# Patient Record
Sex: MALE | Race: WHITE | Employment: FULL TIME | URBAN - METROPOLITAN AREA
[De-identification: names, ages, dates, MRNs, and addresses within clinical notes are randomized per-mention and may not be internally consistent; named-entity substitution may affect disease eponyms.]

---

## 2020-08-04 PROBLEM — E88.81 METABOLIC SYNDROME: Status: ACTIVE | Noted: 2020-08-04

## 2020-08-04 PROBLEM — E78.6 ABNORMALLY LOW HIGH DENSITY LIPOPROTEIN (HDL) CHOLESTEROL WITH HYPERTRIGLYCERIDEMIA: Status: ACTIVE | Noted: 2020-07-22

## 2020-08-04 PROBLEM — E78.1 ABNORMALLY LOW HIGH DENSITY LIPOPROTEIN (HDL) CHOLESTEROL WITH HYPERTRIGLYCERIDEMIA: Status: ACTIVE | Noted: 2020-07-22

## 2020-10-05 PROCEDURE — 88305 TISSUE EXAM BY PATHOLOGIST: CPT | Performed by: INTERNAL MEDICINE

## 2021-02-15 PROBLEM — M54.50 CHRONIC LEFT-SIDED LOW BACK PAIN: Status: ACTIVE | Noted: 2021-02-15

## 2021-02-15 PROBLEM — G89.29 CHRONIC LEFT-SIDED LOW BACK PAIN: Status: ACTIVE | Noted: 2021-02-15

## 2021-02-17 PROBLEM — M79.644 PAIN OF RIGHT THUMB: Status: ACTIVE | Noted: 2021-02-17

## 2021-02-22 PROBLEM — I71.2 ASCENDING AORTIC ANEURYSM: Status: ACTIVE | Noted: 2021-02-22

## 2021-02-22 PROBLEM — I25.10 CAD IN NATIVE ARTERY: Status: ACTIVE | Noted: 2021-02-22

## 2021-02-22 PROBLEM — I35.0 AORTIC VALVE STENOSIS, NONRHEUMATIC: Status: ACTIVE | Noted: 2021-02-22

## 2021-02-22 PROBLEM — I71.21 ASCENDING AORTIC ANEURYSM: Status: ACTIVE | Noted: 2021-02-22

## 2021-02-22 PROBLEM — I71.2 ASCENDING AORTIC ANEURYSM (HCC): Status: ACTIVE | Noted: 2021-02-22

## 2021-04-05 PROBLEM — Z51.81 MEDICATION MONITORING ENCOUNTER: Status: ACTIVE | Noted: 2021-04-05

## 2021-04-05 PROBLEM — E66.09 CLASS 1 OBESITY DUE TO EXCESS CALORIES WITH SERIOUS COMORBIDITY AND BODY MASS INDEX (BMI) OF 34.0 TO 34.9 IN ADULT: Status: ACTIVE | Noted: 2021-04-05

## 2021-09-30 PROBLEM — R73.03 PREDIABETES: Status: ACTIVE | Noted: 2021-09-30

## 2022-03-16 PROBLEM — F51.04 PSYCHOPHYSIOLOGICAL INSOMNIA: Status: ACTIVE | Noted: 2022-03-16

## 2022-03-16 PROBLEM — F32.1 MDD (MAJOR DEPRESSIVE DISORDER), SINGLE EPISODE, MODERATE (HCC): Status: ACTIVE | Noted: 2022-03-16

## 2022-03-16 PROBLEM — F41.1 GAD (GENERALIZED ANXIETY DISORDER): Status: ACTIVE | Noted: 2022-03-16

## 2022-03-16 PROBLEM — F33.0 MDD (MAJOR DEPRESSIVE DISORDER), RECURRENT EPISODE, MILD (HCC): Status: ACTIVE | Noted: 2022-03-16

## 2022-03-17 PROBLEM — I77.810 AORTIC ROOT DILATATION (HCC): Status: ACTIVE | Noted: 2022-03-10

## 2022-03-17 PROBLEM — I71.9 AORTIC ANEURYSM (HCC): Status: ACTIVE | Noted: 2020-09-08

## 2022-03-17 PROBLEM — I35.0 AORTIC VALVE STENOSIS: Status: ACTIVE | Noted: 2017-01-01

## 2022-03-17 PROBLEM — I77.810 ASCENDING AORTA DILATATION (HCC): Status: ACTIVE | Noted: 2022-03-10

## 2022-03-29 ENCOUNTER — APPOINTMENT (OUTPATIENT)
Dept: GENERAL RADIOLOGY | Facility: HOSPITAL | Age: 62
End: 2022-03-29
Attending: EMERGENCY MEDICINE
Payer: COMMERCIAL

## 2022-03-29 ENCOUNTER — APPOINTMENT (OUTPATIENT)
Dept: CT IMAGING | Facility: HOSPITAL | Age: 62
End: 2022-03-29
Attending: EMERGENCY MEDICINE
Payer: COMMERCIAL

## 2022-03-29 ENCOUNTER — HOSPITAL ENCOUNTER (EMERGENCY)
Facility: HOSPITAL | Age: 62
Discharge: HOME | End: 2022-03-29
Attending: EMERGENCY MEDICINE
Payer: COMMERCIAL

## 2022-03-29 VITALS
BODY MASS INDEX: 31.7 KG/M2 | WEIGHT: 214 LBS | SYSTOLIC BLOOD PRESSURE: 130 MMHG | RESPIRATION RATE: 16 BRPM | OXYGEN SATURATION: 97 % | TEMPERATURE: 98 F | DIASTOLIC BLOOD PRESSURE: 72 MMHG | HEIGHT: 69 IN | HEART RATE: 82 BPM

## 2022-03-29 DIAGNOSIS — S80.12XA CONTUSION OF LEFT LEG, INITIAL ENCOUNTER: ICD-10-CM

## 2022-03-29 DIAGNOSIS — S60.418A ABRASION OF INDEX FINGER, INITIAL ENCOUNTER: ICD-10-CM

## 2022-03-29 DIAGNOSIS — S80.11XA CONTUSION OF RIGHT LEG, INITIAL ENCOUNTER: ICD-10-CM
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
## 2022-03-29 DIAGNOSIS — S32.009A CLOSED FRACTURE OF TRANSVERSE PROCESS OF LUMBAR VERTEBRA, INITIAL ENCOUNTER (HCC): Primary | ICD-10-CM

## 2022-03-29 DIAGNOSIS — S09.90XA CLOSED HEAD INJURY, INITIAL ENCOUNTER: ICD-10-CM

## 2022-03-29 DIAGNOSIS — M54.50 LOW BACK PAIN AT MULTIPLE SITES: ICD-10-CM

## 2022-03-29 LAB
ALBUMIN SERPL-MCNC: 4.1 G/DL (ref 3.4–5)
ALBUMIN/GLOB SERPL: 1 {RATIO} (ref 1–2)
ALP LIVER SERPL-CCNC: 69 U/L
ALT SERPL-CCNC: 25 U/L
ANION GAP SERPL CALC-SCNC: 5 MMOL/L (ref 0–18)
AST SERPL-CCNC: 26 U/L (ref 15–37)
BASOPHILS # BLD AUTO: 0.03 X10(3) UL (ref 0–0.2)
BASOPHILS NFR BLD AUTO: 0.3 %
BILIRUB SERPL-MCNC: 0.8 MG/DL (ref 0.1–2)
BUN BLD-MCNC: 14 MG/DL (ref 7–18)
CALCIUM BLD-MCNC: 9.3 MG/DL (ref 8.5–10.1)
CO2 SERPL-SCNC: 27 MMOL/L (ref 21–32)
CREAT BLD-MCNC: 1.08 MG/DL
EOSINOPHIL # BLD AUTO: 0.07 X10(3) UL (ref 0–0.7)
EOSINOPHIL NFR BLD AUTO: 0.6 %
ERYTHROCYTE [DISTWIDTH] IN BLOOD BY AUTOMATED COUNT: 14.3 %
GLOBULIN PLAS-MCNC: 4.1 G/DL (ref 2.8–4.4)
GLUCOSE BLD-MCNC: 82 MG/DL (ref 70–99)
HCT VFR BLD AUTO: 45.2 %
HGB BLD-MCNC: 14.8 G/DL
IMM GRANULOCYTES # BLD AUTO: 0.04 X10(3) UL (ref 0–1)
IMM GRANULOCYTES NFR BLD: 0.4 %
LYMPHOCYTES # BLD AUTO: 2.86 X10(3) UL (ref 1–4)
LYMPHOCYTES NFR BLD AUTO: 25.5 %
MCH RBC QN AUTO: 27.6 PG (ref 26–34)
MCHC RBC AUTO-ENTMCNC: 32.7 G/DL (ref 31–37)
MCV RBC AUTO: 84.2 FL
MONOCYTES NFR BLD AUTO: 6 %
NEUTROPHILS # BLD AUTO: 7.56 X10 (3) UL (ref 1.5–7.7)
NEUTROPHILS # BLD AUTO: 7.56 X10(3) UL (ref 1.5–7.7)
NEUTROPHILS NFR BLD AUTO: 67.2 %
OSMOLALITY SERPL CALC.SUM OF ELEC: 276 MOSM/KG (ref 275–295)
PLATELET # BLD AUTO: 234 10(3)UL (ref 150–450)
POTASSIUM SERPL-SCNC: 3.9 MMOL/L (ref 3.5–5.1)
PROT SERPL-MCNC: 8.2 G/DL (ref 6.4–8.2)
RBC # BLD AUTO: 5.37 X10(6)UL
SODIUM SERPL-SCNC: 133 MMOL/L (ref 136–145)
WBC # BLD AUTO: 11.2 X10(3) UL (ref 4–11)

## 2022-03-29 PROCEDURE — 73130 X-RAY EXAM OF HAND: CPT | Performed by: EMERGENCY MEDICINE

## 2022-03-29 PROCEDURE — 36415 COLL VENOUS BLD VENIPUNCTURE: CPT

## 2022-03-29 PROCEDURE — 99285 EMERGENCY DEPT VISIT HI MDM: CPT

## 2022-03-29 PROCEDURE — 85025 COMPLETE CBC W/AUTO DIFF WBC: CPT | Performed by: EMERGENCY MEDICINE

## 2022-03-29 PROCEDURE — 74177 CT ABD & PELVIS W/CONTRAST: CPT | Performed by: EMERGENCY MEDICINE

## 2022-03-29 PROCEDURE — 70450 CT HEAD/BRAIN W/O DYE: CPT | Performed by: EMERGENCY MEDICINE

## 2022-03-29 PROCEDURE — 80053 COMPREHEN METABOLIC PANEL: CPT | Performed by: EMERGENCY MEDICINE

## 2022-03-29 PROCEDURE — 73590 X-RAY EXAM OF LOWER LEG: CPT | Performed by: EMERGENCY MEDICINE

## 2022-03-29 PROCEDURE — 72125 CT NECK SPINE W/O DYE: CPT | Performed by: EMERGENCY MEDICINE

## 2022-03-29 PROCEDURE — 72110 X-RAY EXAM L-2 SPINE 4/>VWS: CPT | Performed by: EMERGENCY MEDICINE

## 2022-03-29 RX ORDER — HYDROCODONE BITARTRATE AND ACETAMINOPHEN 5; 325 MG/1; MG/1
1 TABLET ORAL ONCE
Status: COMPLETED | OUTPATIENT
Start: 2022-03-29 | End: 2022-03-29

## 2022-03-29 RX ORDER — HYDROCODONE BITARTRATE AND ACETAMINOPHEN 5; 325 MG/1; MG/1
1 TABLET ORAL EVERY 6 HOURS PRN
Qty: 10 TABLET | Refills: 0 | Status: SHIPPED | OUTPATIENT
Start: 2022-03-29 | End: 2022-04-03

## 2022-03-29 RX ORDER — IOHEXOL 350 MG/ML
100 INJECTION, SOLUTION INTRAVENOUS
Status: COMPLETED | OUTPATIENT
Start: 2022-03-29 | End: 2022-03-29
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
## 2022-04-02 PROBLEM — M85.852 OSTEOPENIA OF NECK OF LEFT FEMUR: Status: ACTIVE | Noted: 2022-03-28

## 2022-05-02 ENCOUNTER — OFFICE VISIT (OUTPATIENT)
Dept: SURGERY | Facility: CLINIC | Age: 62
End: 2022-05-02
Payer: COMMERCIAL

## 2022-05-02 VITALS
DIASTOLIC BLOOD PRESSURE: 90 MMHG | HEART RATE: 70 BPM | WEIGHT: 214 LBS | SYSTOLIC BLOOD PRESSURE: 120 MMHG | HEIGHT: 69 IN | BODY MASS INDEX: 31.7 KG/M2

## 2022-05-02 DIAGNOSIS — S32.009A CLOSED FRACTURE OF TRANSVERSE PROCESS OF LUMBAR VERTEBRA, INITIAL ENCOUNTER (HCC): Primary | ICD-10-CM

## 2022-05-02 DIAGNOSIS — M54.59 MECHANICAL LOW BACK PAIN: ICD-10-CM

## 2022-05-02 RX ORDER — BUPROPION HYDROCHLORIDE 200 MG/1
200 TABLET, EXTENDED RELEASE ORAL EVERY MORNING
COMMUNITY
Start: 2022-04-29

## 2022-05-02 RX ORDER — TRAMADOL HYDROCHLORIDE 50 MG/1
50 TABLET ORAL
Qty: 60 TABLET | Refills: 0 | Status: SHIPPED | OUTPATIENT
Start: 2022-05-02

## 2022-05-02 RX ORDER — PLECANATIDE 3 MG/1
1 TABLET ORAL DAILY
COMMUNITY
Start: 2022-04-28 | End: 2022-05-28
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
## 2022-05-11 ENCOUNTER — TELEPHONE (OUTPATIENT)
Dept: PHYSICAL THERAPY | Facility: HOSPITAL | Age: 62
End: 2022-05-11

## 2022-05-16 ENCOUNTER — OFFICE VISIT (OUTPATIENT)
Dept: PHYSICAL THERAPY | Age: 62
End: 2022-05-16
Attending: NEUROLOGICAL SURGERY
Payer: COMMERCIAL

## 2022-05-16 DIAGNOSIS — M54.59 MECHANICAL LOW BACK PAIN: ICD-10-CM

## 2022-05-16 DIAGNOSIS — S32.009A CLOSED FRACTURE OF TRANSVERSE PROCESS OF LUMBAR VERTEBRA, INITIAL ENCOUNTER (HCC): ICD-10-CM

## 2022-05-16 PROCEDURE — 97110 THERAPEUTIC EXERCISES: CPT

## 2022-05-16 PROCEDURE — 97161 PT EVAL LOW COMPLEX 20 MIN: CPT

## 2022-05-17 ENCOUNTER — TELEPHONE (OUTPATIENT)
Dept: PHYSICAL THERAPY | Facility: HOSPITAL | Age: 62
End: 2022-05-17

## 2022-05-20 ENCOUNTER — OFFICE VISIT (OUTPATIENT)
Dept: PHYSICAL THERAPY | Age: 62
End: 2022-05-20
Attending: INTERNAL MEDICINE
Payer: COMMERCIAL

## 2022-05-20 PROCEDURE — 97110 THERAPEUTIC EXERCISES: CPT

## 2022-05-20 NOTE — PROGRESS NOTES
Insurance (Authorized # of Visits):  TPL/BCBS OOS ($25 co-pay), 16 visits via plan of care. Diagnosis:   Closed fracture of transverse process of lumbar vertebra, initial encounter ([facility]) (S32.009A)  Mechanical low back pain (M54.59)  Activity as tolerated              Referring Provider: Mary Card   Date of Evaluation:    5/16/2022  Precautions:  None  Next MD visit:   none scheduled  Date of Surgery: n      Subjective: Pain at its worst 3/10. 10% better since last visit. Doing about 60% of home program. Still pain with walking and standing in his back. Objective:   Tested 5/20/2022:  Lumbar AROM: (* denotes performed with pain)  Flexion: min*  Extension: mod*  Sidebending: R min*; L min/mod*  L shift    Strength:  Hip abduction: R 5/5; L 3-/5    Intact:  Piriformis: R intact; L intact    Strength: Hip Extension: R 5/5; L 5/5       Tested 5/16/2022: Observation/Posture: sit slumped and with left lateral bend* (painful)       Assessment: Given trial of flexion/rotation knees to the R. A bit better after abdominal strengthening, added to home program      Goals: (to be met in 16 visits)   Pt will improve FOTO score from 42/100 to 62/100 to display improved ability to navigate stairs  Pt will reduce pain at its worst from 3/10 to 0-1/10 to allow for improved ability to get in and out of bed  Pt will lumbar flexion from min loss with pain to min/nil loss without pain allow for improved ability to do housework  Pt will be independent with home program to allow for maintenance of goals achieved in therapy. Plan:Exhaust flexion/rotation stretching. See about hips off center stretching vs. Sagittal plane stretching. Charges: 3 there ex     Total Timed Treatment: 45 min  Total Treatment Time: 45 min  Date: 5/20/2022  Tx#: 2 Date: Tx#: 3 Date: Tx#: 4 Date: Tx#: 5 Date:   Tx#: 6   Ther-Ex  R side glide (L shoulder on wall) x20: a bit better    Shift correction lateral only x20: a bit more better    Flexion/rotation knees to the R 3 minutes: more better (6 sets done 30'-3 minutes)    Flexion lumbar with red bands 30x2: better after    Educated on home program, see below. Verbal, visual and tactile cues for there ex.          Manual         N Re-Ed

## 2022-05-23 ENCOUNTER — OFFICE VISIT (OUTPATIENT)
Dept: PHYSICAL THERAPY | Age: 62
End: 2022-05-23
Attending: NEUROLOGICAL SURGERY
Payer: COMMERCIAL

## 2022-05-23 DIAGNOSIS — M54.59 MECHANICAL LOW BACK PAIN: ICD-10-CM

## 2022-05-23 DIAGNOSIS — S32.009A CLOSED FRACTURE OF TRANSVERSE PROCESS OF LUMBAR VERTEBRA, INITIAL ENCOUNTER (HCC): ICD-10-CM

## 2022-05-23 PROCEDURE — 97110 THERAPEUTIC EXERCISES: CPT

## 2022-05-23 PROCEDURE — 97112 NEUROMUSCULAR REEDUCATION: CPT

## 2022-05-23 PROCEDURE — 97140 MANUAL THERAPY 1/> REGIONS: CPT

## 2022-05-23 NOTE — PROGRESS NOTES
Insurance (Authorized # of Visits):  TPL/BCBS OOS ($25 co-pay), 16 visits via plan of care. Diagnosis:   Closed fracture of transverse process of lumbar vertebra, initial encounter ([facility]) (S32.009A)  Mechanical low back pain (M54.59)  Activity as tolerated              Referring Provider: Jenifer Rolon   Date of Evaluation:    5/16/2022  Precautions:  None  Next MD visit:   none scheduled  Date of Surgery: n      Subjective: Pain at its worst 3/10. 15% better since overall since starting Physical Therapy. Doing about 60% of home program, limited two days ago because felt some weakness post-COVID booster shot. Still pain with walking and standing in his back. Objective:   Tested 5/23/2022:  Lumbar AROM: (* denotes performed with pain)  Flexion: min*  Extension: mod/min*  Sidebending: R min*; L min/mod*    Strength:  Hip abduction: R 4/5* (R hip); L 4-/5* (back)    Blood pressure:  139/102 mmHg (during patient forces flexion/rotation knees to the R)  151/92 mmHg (after about 2 minutes rest after the above exercise, supine)    Observation/Posture: sit slumped       Assessment: Given trial of prone press ups after flexion/rotation knees to the R, added to home program      Goals: (to be met in 16 visits)   Pt will improve FOTO score from 42/100 to 62/100 to display improved ability to navigate stairs  Pt will reduce pain at its worst from 3/10 to 0-1/10 to allow for improved ability to get in and out of bed  Pt will lumbar flexion from min loss with pain to min/nil loss without pain allow for improved ability to do housework  Pt will be independent with home program to allow for maintenance of goals achieved in therapy. Plan:Exhaust flexion/rotation stretching. See about hips off center stretching vs. Sagittal plane stretching. Charges: 2 there ex, 1 manual therapy     Total Timed Treatment: 45 min  Total Treatment Time: 45 min  Date: 5/20/2022  Tx#: 2 Date: 5/23/2022  Tx#: 3 Date: Tx#: 4 Date:   Tx#: 5 Date: Tx#: 6   Ther-Ex  R side glide (L shoulder on wall) x20: a bit better    Shift correction lateral only x20: a bit more better    Flexion/rotation knees to the R 3 minutes: more better (6 sets done 30'-3 minutes)    Flexion lumbar with red bands 30x2: better after    Educated on home program, see below. Verbal, visual and tactile cues for there ex. There ex:  R side glide, correct form 1 minute (2 sets done, 2nd set 2 minutes)    Prone press up, done initially 15x2: produce, better. After better signs but L shift at rest, can self-correct. Chest fly 15x 7lbs        Educated on home program, see below. Verbal, visual and tactile cues for there ex. Manual   Manual  Extension/rotation mobilization on R side of back    Flexion/rotation to the R therapist forces. N Re-Ed   N Re-Ed  Education on ideal sitting posture, practiced in clinic    Education on fitness activities, told to return to them, instructed to buy 7lbs weights for home use. Education on pathology and plan of care.

## 2022-05-25 ENCOUNTER — OFFICE VISIT (OUTPATIENT)
Dept: PHYSICAL THERAPY | Age: 62
End: 2022-05-25
Attending: NEUROLOGICAL SURGERY
Payer: COMMERCIAL

## 2022-05-25 DIAGNOSIS — M54.59 MECHANICAL LOW BACK PAIN: ICD-10-CM

## 2022-05-25 DIAGNOSIS — S32.009A CLOSED FRACTURE OF TRANSVERSE PROCESS OF LUMBAR VERTEBRA, INITIAL ENCOUNTER (HCC): ICD-10-CM

## 2022-05-25 PROCEDURE — 97140 MANUAL THERAPY 1/> REGIONS: CPT

## 2022-05-25 PROCEDURE — 97110 THERAPEUTIC EXERCISES: CPT

## 2022-05-25 NOTE — PROGRESS NOTES
Progress Summary  Pt has attended 4 visits in Physical Therapy. Insurance (Authorized # of Visits):  TPL/BCBS OOS ($25 co-pay), 16 visits via plan of care. Diagnosis:   Closed fracture of transverse process of lumbar vertebra, initial encounter ([facility]) (S32.009A)  Mechanical low back pain (M54.59)  Activity as tolerated              Referring Provider: Deangelo George   Date of Evaluation:    5/16/2022  Precautions:  None  Next MD visit:   none scheduled  Date of Surgery: n      Subjective: Pain at its worst 2-3/10. 20% better since overall since starting Physical Therapy. Doing about 60% of home program. Still pain with heavy housework. Unable to put splash guards on his car due to the position. However, no can lift groceries and perform stair navigation without difficulty. Objective:   Tested 5/25/2022:  Lumbar AROM: (* denotes performed with pain)  Flexion: min/nil  Extension: mod*  Sidebending: R min*; L min/mod*    Strength:  Hip abduction: R 4+/5; L 4+/5    Blood pressure:  163/112 mmHg (during patient forces flexion/rotation knees to the L)  145/95 mmHg (At rest, supine)      Assessment: Patient has met most short term goals and is progressing towards long term goals after four visits of Physical Therapy. Goals: (to be met in 16 visits)   Pt will improve FOTO score from 42/100 to 62/100 to display improved ability to navigate stairs (Goal met 5/25/2022)  Pt will reduce pain at its worst from 3/10 to 0-1/10 to allow for improved ability to get in and out of bed (15% progress on 5/25/2022)  Pt will lumbar flexion from min loss with pain to min/nil loss without pain allow for improved ability to do housework (Goal met 5/25/2022)  Pt will be independent with home program to allow for maintenance of goals achieved in therapy.  (60% progress on 5/25/2022)    New Goals 5/25/2022:  Pt will improve FOTO score from 70/100 to 80/100 to display improved ability to perform heavy housework  Pt will display side glide loss of motion from min/mod loss to min/nil loss to improve ability to perform heavy housework    New Goals 5/25/2022    FOTO: 70/100    Plan: Continue skilled Physical Therapy 2 x/week or a total of 16 visits over a 90 day period. Treatment will include: manual therapy, therapeutic exercise, therapeutic activites, neuromuscular reeducation and home program         Patient/Family/Caregiver was advised of these findings, precautions, and treatment options and has agreed to actively participate in planning and for this course of care. Thank you for your referral. If you have any questions, please contact me at Dept: [phone]. Sincerely,  Electronically signed by therapist: Carol Dickey PT     Physician's certification required:  No  Please co-sign or sign and return this letter via fax as soon as possible to [phone]. I certify the need for these services furnished under this plan of treatment and while under my care. X___________________________________________________ Date____________________    Certification From: [id number]/53/8367  To:8/23/2022       Additional Ideas:  Exhaust flexion/rotation stretching. See about hips off center stretching vs. Sagittal plane stretching. Charges: 3 there ex, 1 manual therapy     Total Timed Treatment: 55 min  Total Treatment Time: 55 min  Date: 5/20/2022  Tx#: 2 Date: 5/23/2022  Tx#: 3 Date: 5/25/2022  Tx#: 4 Date: Tx#: 5 Date: Tx#: 6   Ther-Ex  R side glide (L shoulder on wall) x20: a bit better    Shift correction lateral only x20: a bit more better    Flexion/rotation knees to the R 3 minutes: more better (6 sets done 30'-3 minutes)    Flexion lumbar with red bands 30x2: better after    Educated on home program, see below. Verbal, visual and tactile cues for there ex. There ex:  Flexion/rotation knees to the R correct form 1 minute (2 sets done, 2nd set 2 minutes)    Prone press up, done initially 15x2: produce, better.  After better signs but L shift at rest, can self-correct. Chest fly 15x 7lbs        Educated on home program, see below. Verbal, visual and tactile cues for there ex. There ex:  Flexion/rotation knees to the L 10x2: somewhat better (no change after 2nd set which mobilization before)    Flexion/rotation knees to the L 3 minutes x3: better (no better 2nd set after a bit better to the same after mobilization)    Calf raise double leg 30-40x2    Wall slide [address] single bias [employer] on home program, see below. Verbal, visual and tactile cues for there ex. Manual   Manual  Extension/rotation mobilization on R side of back    Flexion/rotation to the R therapist forces. Manual 10 minutes  Extension mobilization with hips to the L    Extension/rotation mobilization on L side of back     N Re-Ed   N Re-Ed  Education on ideal sitting posture, practiced in clinic    Education on fitness activities, told to return to them, instructed to buy 7lbs weights for home use. Education on pathology and plan of care.

## 2022-05-27 ENCOUNTER — TELEPHONE (OUTPATIENT)
Dept: PHYSICAL THERAPY | Age: 62
End: 2022-05-27

## 2022-05-27 NOTE — TELEPHONE ENCOUNTER
Told office of cardiologist about patient's blood pressure readings before/during/after lumbar flexion/rotaiton stretching. Office said they would consult with the patient's cardiologist and let Physical Therapist know if blood pressure readings justify changing Physical Therapy plan of care.

## 2022-05-31 ENCOUNTER — TELEPHONE (OUTPATIENT)
Dept: PHYSICAL THERAPY | Age: 62
End: 2022-05-31

## 2022-05-31 NOTE — TELEPHONE ENCOUNTER
Cardiologist office called, medications changed for patient. No changes needed for Physical Therapy exercises at this time.

## 2022-06-02 ENCOUNTER — APPOINTMENT (OUTPATIENT)
Dept: PHYSICAL THERAPY | Age: 62
End: 2022-06-02
Attending: NEUROLOGICAL SURGERY
Payer: COMMERCIAL

## 2022-06-02 ENCOUNTER — TELEPHONE (OUTPATIENT)
Dept: PHYSICAL THERAPY | Facility: HOSPITAL | Age: 62
End: 2022-06-02

## 2022-06-06 ENCOUNTER — OFFICE VISIT (OUTPATIENT)
Dept: SURGERY | Facility: CLINIC | Age: 62
End: 2022-06-06
Payer: COMMERCIAL

## 2022-06-06 VITALS — SYSTOLIC BLOOD PRESSURE: 120 MMHG | DIASTOLIC BLOOD PRESSURE: 80 MMHG | HEART RATE: 80 BPM

## 2022-06-06 DIAGNOSIS — S32.009A CLOSED FRACTURE OF TRANSVERSE PROCESS OF LUMBAR VERTEBRA, INITIAL ENCOUNTER (HCC): Primary | ICD-10-CM

## 2022-06-06 PROCEDURE — 3074F SYST BP LT 130 MM HG: CPT | Performed by: NEUROLOGICAL SURGERY

## 2022-06-06 PROCEDURE — 99213 OFFICE O/P EST LOW 20 MIN: CPT | Performed by: NEUROLOGICAL SURGERY

## 2022-06-06 PROCEDURE — 3079F DIAST BP 80-89 MM HG: CPT | Performed by: NEUROLOGICAL SURGERY

## 2022-06-06 RX ORDER — AMLODIPINE BESYLATE 10 MG/1
10 TABLET ORAL DAILY
COMMUNITY
Start: 2022-05-31

## 2022-06-08 ENCOUNTER — OFFICE VISIT (OUTPATIENT)
Dept: PHYSICAL THERAPY | Age: 62
End: 2022-06-08
Attending: NEUROLOGICAL SURGERY
Payer: COMMERCIAL

## 2022-06-08 DIAGNOSIS — M54.59 MECHANICAL LOW BACK PAIN: ICD-10-CM

## 2022-06-08 DIAGNOSIS — S32.009A CLOSED FRACTURE OF TRANSVERSE PROCESS OF LUMBAR VERTEBRA, INITIAL ENCOUNTER (HCC): ICD-10-CM

## 2022-06-08 PROCEDURE — 97110 THERAPEUTIC EXERCISES: CPT

## 2022-06-08 NOTE — PROGRESS NOTES
Insurance (Authorized # of Visits):  TPL/BCBS OOS ($25 co-pay), 16 visits via plan of care. Diagnosis:   Closed fracture of transverse process of lumbar vertebra, initial encounter ([facility] [address] [address] (S32.009A)  Mechanical low back pain (M54.59)  Activity as tolerated              Referring Provider: Kenneth Merlin   Date of Evaluation:    2022  Precautions:  None  Next MD visit:   none scheduled  Date of Surgery: n      Subjective: Pain at its worst 2-3/10. Doing about 60% of home program. Still pain with heavy housework such as mopping or reaching down forward for cleaning. Objective:     Lumbar AROM: (* denotes performed with pain)  Flexion: min/nil  Extension: min*  Sidebending: R min*; L min/mod*    Special Tests:  Walkin/10 pain walking*    Tested 2022:  Strength:  Hip abduction: R 4+/5; L 4+/5        Assessment: Given trial of hips to the R with prone press ups. Goals: (to be met in 16 visits)   Pt will improve FOTO score from 42/100 to 62/100 to display improved ability to navigate stairs (Goal met 2022)  Pt will reduce pain at its worst from 3/10 to 0-1/10 to allow for improved ability to get in and out of bed (15% progress on 2022)  Pt will lumbar flexion from min loss with pain to min/nil loss without pain allow for improved ability to do housework (Goal met 2022)  Pt will be independent with home program to allow for maintenance of goals achieved in therapy.  (60% progress on 2022)    New Goals 2022:  Pt will improve FOTO score from 70/100 to 80/100 to display improved ability to perform heavy housework  Pt will display side glide loss of motion from min/mod loss to min/nil loss to improve ability to perform heavy housework      FOTO: 70/100    Plan:   Progress strength      If no better:  See form of hips off center (was this correct?)  Extension standing x10 (brief)  Side glides R (L shoulder on wall, if on wall), shift correction progression    Exhaust flexion/rotation stretching knees to the R and mobilization here if unable         Charges: 3 there ex,   Total Timed Treatment: 39 min  Total Treatment Time: 39 min  Date: 5/20/2022  Tx#: 2 Date: 5/23/2022  Tx#: 3 Date: 5/25/2022  Tx#: 4 Date: 6/8/2022  Tx#: 5 Date: Tx#: 6   Ther-Ex  R side glide (L shoulder on wall) x20: a bit better    Shift correction lateral only x20: a bit more better    Flexion/rotation knees to the R 3 minutes: more better (6 sets done 30'-3 minutes)    Flexion lumbar with red bands 30x2: better after    Educated on home program, see below. Verbal, visual and tactile cues for there ex. There ex:  Flexion/rotation knees to the R correct form 1 minute (2 sets done, 2nd set 2 minutes)    Prone press up, done initially 15x2: produce, better. After better signs but L shift at rest, can self-correct. Chest fly 15x 7lbs        Educated on home program, see below. Verbal, visual and tactile cues for there ex. There ex:  Flexion/rotation knees to the L 10x2: somewhat better (no change after 2nd set which mobilization before)    Flexion/rotation knees to the L 3 minutes x3: better (no better 2nd set after a bit better to the same after mobilization)    Calf raise double leg 30-40x2    Wall slide [address] single bias [employer] on home program, see below. Verbal, visual and tactile cues for there ex. There ex:  Prone press up x10: produce, no worse. No better after. 2nd set with overpressure: after a bit worse (losses side glide ROM). Hips to the R x10: produce, no worse. After a bit better (2nd set after mobilization a bit more better) (2 more sets done)    Flexion/rotaiton knees to the R 2 minutes therapist overpressure: a bit more better    Calf raise single leg hands on counter 30x    Wall slide 20x2    Bridge x20    Educated on home program, see below. Verbal, visual and tactile cues for there ex.             Manual   Manual  Extension/rotation mobilization on R side of back    Flexion/rotation to the R therapist forces. Manual 10 minutes  Extension mobilization with hips to the L    Extension/rotation mobilization on L side of back     N Re-Ed   N Re-Ed  Education on ideal sitting posture, practiced in clinic    Education on fitness activities, told to return to them, instructed to buy 7lbs weights for home use. Education on pathology and plan of care.

## 2022-06-10 ENCOUNTER — OFFICE VISIT (OUTPATIENT)
Dept: PHYSICAL THERAPY | Age: 62
End: 2022-06-10
Attending: NEUROLOGICAL SURGERY
Payer: COMMERCIAL

## 2022-06-10 DIAGNOSIS — S32.009A CLOSED FRACTURE OF TRANSVERSE PROCESS OF LUMBAR VERTEBRA, INITIAL ENCOUNTER (HCC): ICD-10-CM

## 2022-06-10 DIAGNOSIS — M54.59 MECHANICAL LOW BACK PAIN: ICD-10-CM

## 2022-06-10 PROCEDURE — 97110 THERAPEUTIC EXERCISES: CPT

## 2022-06-10 NOTE — PROGRESS NOTES
Insurance (Authorized # of Visits):  TPL/BCBS OOS ($25 co-pay), 16 visits via plan of care. Diagnosis:   Closed fracture of transverse process of lumbar vertebra, initial encounter ([facility] [address] (S32.009A)  Mechanical low back pain (M54.59)  Activity as tolerated              Referring Provider: Suzanna Cabrera   Date of Evaluation:    2022  Precautions:  None  Next MD visit:   none scheduled  Date of Surgery: n      Subjective: Pain at its worst 2-3/10. Doing about 75% of home program. Still pain with heavy housework such as mopping or reaching down forward for cleaning. However, mobility is better. Objective:   Tested 6/10/2022  Lumbar AROM: (* denotes performed with pain)  Flexion: min/nil  Extension: min*  Sidebending: R min*; L min/mod*    Special Tests:  Walkin/10 pain walking*    Tested 2022:  Strength:  Hip abduction: R 4+/5; L 4+/5        Assessment: Given trial of R side glide, better after with walking. Goals: (to be met in 16 visits)   Pt will improve FOTO score from 42/100 to 62/100 to display improved ability to navigate stairs (Goal met 2022)  Pt will reduce pain at its worst from 3/10 to 0-1/10 to allow for improved ability to get in and out of bed (15% progress on 2022)  Pt will lumbar flexion from min loss with pain to min/nil loss without pain allow for improved ability to do housework (Goal met 2022)  Pt will be independent with home program to allow for maintenance of goals achieved in therapy. (60% progress on 2022)    New Goals 2022:  Pt will improve FOTO score from 70/100 to 80/100 to display improved ability to perform heavy housework  Pt will display side glide loss of motion from min/mod loss to min/nil loss to improve ability to perform heavy housework      FOTO: 70/100    Plan:   Progress strength (Review current home program, see form and progress as able)  Exhaust R side glide.         Future:  Extension standing x10 (brief)      Exhaust flexion/rotation stretching knees to the R and mobilization here if side exhausted and not ready for sagittal         Charges: 3 there ex,   Total Timed Treatment: 39 min  Total Treatment Time: 39 min  Date: 5/20/2022  Tx#: 2 Date: 5/23/2022  Tx#: 3 Date: 5/25/2022  Tx#: 4 Date: 6/8/2022  Tx#: 5 Date:  6/10/2022  Tx#: 6       Ther-Ex  R side glide (L shoulder on wall) x20: a bit better    Shift correction lateral only x20: a bit more better    Flexion/rotation knees to the R 3 minutes: more better (6 sets done 30'-3 minutes)    Flexion lumbar with red bands 30x2: better after    Educated on home program, see below. Verbal, visual and tactile cues for there ex. There ex:  Flexion/rotation knees to the R correct form 1 minute (2 sets done, 2nd set 2 minutes)    Prone press up, done initially 15x2: produce, better. After better signs but L shift at rest, can self-correct. Chest fly 15x 7lbs        Educated on home program, see below. Verbal, visual and tactile cues for there ex. There ex:  Flexion/rotation knees to the L 10x2: somewhat better (no change after 2nd set which mobilization before)    Flexion/rotation knees to the L 3 minutes x3: better (no better 2nd set after a bit better to the same after mobilization)    Calf raise double leg 30-40x2    Wall slide [address] single bias [employer] on home program, see below. Verbal, visual and tactile cues for there ex. There ex:  Prone press up x10: produce, no worse. No better after. 2nd set with overpressure: after a bit worse (losses side glide ROM). Hips to the R x10: produce, no worse. After a bit better (2nd set after mobilization a bit more better) (2 more sets done)    Flexion/rotaiton knees to the R 2 minutes therapist overpressure: a bit more better    Calf raise single leg hands on counter 30x    Wall slide 20x2    Bridge x20    Educated on home program, see below.   Verbal, visual and tactile cues for there ex.         There ex:  R side glide (L shoulder on wall) 1-35x8: produce, no worse. After better walking. Shift correction x20 lateral only: after better    Bridge single leg bias x15    Lower abdominal strengthening supine one leg dynamic 5-10x2    Bird dog arm only 20x2    Educated on home program, see below. Verbal, visual and tactile cues for there ex. Manual   Manual  Extension/rotation mobilization on R side of back    Flexion/rotation to the R therapist forces. Manual 10 minutes  Extension mobilization with hips to the L    Extension/rotation mobilization on L side of back         N Re-Ed   N Re-Ed  Education on ideal sitting posture, practiced in clinic    Education on fitness activities, told to return to them, instructed to buy 7lbs weights for home use. Education on pathology and plan of care.

## 2022-06-13 ENCOUNTER — OFFICE VISIT (OUTPATIENT)
Dept: PHYSICAL THERAPY | Age: 62
End: 2022-06-13
Attending: NEUROLOGICAL SURGERY
Payer: COMMERCIAL

## 2022-06-13 DIAGNOSIS — M54.59 MECHANICAL LOW BACK PAIN: ICD-10-CM

## 2022-06-13 DIAGNOSIS — S32.009A CLOSED FRACTURE OF TRANSVERSE PROCESS OF LUMBAR VERTEBRA, INITIAL ENCOUNTER (HCC): ICD-10-CM

## 2022-06-13 PROCEDURE — 97110 THERAPEUTIC EXERCISES: CPT

## 2022-06-13 NOTE — PROGRESS NOTES
Insurance (Authorized # of Visits):  TPL/BCBS OOS ($25 co-pay), 16 visits via plan of care. Diagnosis:   Closed fracture of transverse process of lumbar vertebra, initial encounter (Formerly [facility]) (S32.009A)  Mechanical low back pain (M54.59)  Activity as tolerated              Referring Provider: Janeth Rowland   Date of Evaluation:    2022  Precautions:  None  Next MD visit:   none scheduled  Date of Surgery: N/A      Subjective: Pain at its worst 2-3/10. Doing about 80% of home program. Still pain with heavy housework such as mopping or reaching down forward for cleaning. However, mobility is better. First time he was able to mow his lawn without taking an extended, roughly 24 hour, break with this chore for. However, mowing lawn took 3 hours, and used to only take a little over 2 hours, back limits ability to [employer]. Objective:   Tested 2022  Lumbar AROM: (* denotes performed with pain)  Flexion: min/nil  Extension: min*  Sidebending: R min; L min*    Special Tests:  Walkin/10 pain walking*    Tested 2022:  Strength:  Hip abduction: R 4+/5; L 4+/5        Assessment: Improving with R side glide stretching. Tolerated strength progressions with being no worse after. Goals: (to be met in 16 visits)   Pt will improve FOTO score from 42/100 to 62/100 to display improved ability to navigate stairs (Goal met 2022)  Pt will reduce pain at its worst from 3/10 to 0-1/10 to allow for improved ability to get in and out of bed (15% progress on 2022)  Pt will lumbar flexion from min loss with pain to min/nil loss without pain allow for improved ability to do housework (Goal met 2022)  Pt will be independent with home program to allow for maintenance of goals achieved in therapy.  (60% progress on 2022)    New Goals 2022:  Pt will improve FOTO score from 70/100 to 80/100 to display improved ability to perform heavy housework  Pt will display side glide loss of motion from min/mod loss to min/nil loss to improve ability to perform heavy housework      FOTO: 70/100    Plan:   Shift correction next visit if no better. Progress strength (Review current home program, see form and progress as able)  Exhaust R side glide. Future:  Extension standing x10 (brief)      Exhaust flexion/rotation stretching knees to the R and mobilization here if side exhausted and not ready for sagittal         Charges: 3 there ex,   Total Timed Treatment: 39 min  Total Treatment Time: 39 min  Date: 5/20/2022  Tx#: 2 Date: 5/23/2022  Tx#: 3 Date: 5/25/2022  Tx#: 4 Date: 6/8/2022  Tx#: 5 Date:  6/10/2022  Tx#: 6 6/13/2022  Tx#: 7      Ther-Ex  R side glide (L shoulder on wall) x20: a bit better    Shift correction lateral only x20: a bit more better    Flexion/rotation knees to the R 3 minutes: more better (6 sets done 30'-3 minutes)    Flexion lumbar with red bands 30x2: better after    Educated on home program, see below. Verbal, visual and tactile cues for there ex. There ex:  Flexion/rotation knees to the R correct form 1 minute (2 sets done, 2nd set 2 minutes)    Prone press up, done initially 15x2: produce, better. After better signs but L shift at rest, can self-correct. Chest fly 15x 7lbs        Educated on home program, see below. Verbal, visual and tactile cues for there ex. There ex:  Flexion/rotation knees to the L 10x2: somewhat better (no change after 2nd set which mobilization before)    Flexion/rotation knees to the L 3 minutes x3: better (no better 2nd set after a bit better to the same after mobilization)    Calf raise double leg 30-40x2    Wall slide [place] [address] single bias [employer] on home program, see below. Verbal, visual and tactile cues for there ex. There ex:  Prone press up x10: produce, no worse. No better after. 2nd set with overpressure: after a bit worse (losses side glide ROM). Hips to the R x10: produce, no worse.  After a bit better (2nd set after mobilization a bit more better) (2 more sets done)    Flexion/rotaiton knees to the R 2 minutes therapist overpressure: a bit more better    Calf raise single leg hands on counter 30x    Wall slide 20x2    Bridge x20    Educated on home program, see below. Verbal, visual and tactile cues for there ex. There ex:  R side glide (L shoulder on wall) 1-35x8: produce, no worse. After better walking. Shift correction x20 lateral only: after better    Bridge single leg bias x15    Lower abdominal strengthening supine one leg dynamic 5-10x2    Bird dog arm only 20x2    Educated on home program, see below. Verbal, visual and tactile cues for there ex. Manual   Manual  Extension/rotation mobilization on R side of back    Flexion/rotation to the R therapist forces. Manual 10 minutes  Extension mobilization with hips to the L    Extension/rotation mobilization on L side of back         N Re-Ed   N Re-Ed  Education on ideal sitting posture, practiced in clinic    Education on fitness activities, told to return to them, instructed to buy 7lbs weights for home use. Education on pathology and plan of care.

## 2022-06-17 ENCOUNTER — APPOINTMENT (OUTPATIENT)
Dept: PHYSICAL THERAPY | Age: 62
End: 2022-06-17
Attending: NEUROLOGICAL SURGERY
Payer: COMMERCIAL

## 2022-06-17 ENCOUNTER — TELEPHONE (OUTPATIENT)
Dept: PHYSICAL THERAPY | Facility: HOSPITAL | Age: 62
End: 2022-06-17

## 2022-06-22 ENCOUNTER — OFFICE VISIT (OUTPATIENT)
Dept: PHYSICAL THERAPY | Age: 62
End: 2022-06-22
Attending: NEUROLOGICAL SURGERY
Payer: COMMERCIAL

## 2022-06-22 ENCOUNTER — TELEPHONE (OUTPATIENT)
Dept: PHYSICAL THERAPY | Facility: HOSPITAL | Age: 62
End: 2022-06-22

## 2022-06-22 PROCEDURE — 97110 THERAPEUTIC EXERCISES: CPT

## 2022-06-22 NOTE — PROGRESS NOTES
Insurance (Authorized # of Visits):  TPL/BCBS OOS ($25 co-pay), 16 visits via plan of care. Diagnosis:   Closed fracture of transverse process of lumbar vertebra, initial encounter (Formerly [facility]) (S32.009A)  Mechanical low back pain (M54.59)  Activity as tolerated              Referring Provider: Rossana Mistry   Date of Evaluation:    2022  Precautions:  None  Next MD visit:   none scheduled  Date of Surgery: N/A      Subjective: Pain at its worst 2/10. Doing about 80% of home program. Better, able to reach down with less pain and easier for housework. Objective:   Tested 2022  Lumbar AROM: (* denotes performed with pain)  Flexion: min/nil  Extension: min*  Sidebending: R min*; L min*    Intact:  Special Tests:  Walkin/10 pain walking    Tested 2022:  Strength:  Hip abduction: R 4+/5; L 4+/5        Assessment: Improving with R side glide stretching. Tolerated strength progressions with being no worse after. Able to do shift correction with extension force and full lateral translation of spine. Goals: (to be met in 16 visits)   Pt will improve FOTO score from 42/100 to 62/100 to display improved ability to navigate stairs (Goal met 2022)  Pt will reduce pain at its worst from 3/10 to 0-1/10 to allow for improved ability to get in and out of bed (15% progress on 2022)  Pt will lumbar flexion from min loss with pain to min/nil loss without pain allow for improved ability to do housework (Goal met 2022)  Pt will be independent with home program to allow for maintenance of goals achieved in therapy. (60% progress on 2022)    New Goals 2022:  Pt will improve FOTO score from 70/100 to 80/100 to display improved ability to perform heavy housework  Pt will display side glide loss of motion from min/mod loss to min/nil loss to improve ability to perform heavy housework      FOTO: 70/100    Plan:   See extension in standing, make sure no worse, if not sure see 2nd set.  (Have him walk a 30-60 seconds prior to extension stretching if added to home program after lateral stretching)    Shift correction next visit      Progress strength (Review current home program, see form and progress as able)  Exhaust R side glide. Future:  Extension standing x10 (brief)      Exhaust flexion/rotation stretching knees to the R and mobilization here if side exhausted and not ready for sagittal         Charges: 3 there ex,   Total Timed Treatment: 39 min  Total Treatment Time: 39 min  Date: 5/20/2022  Tx#: 2 Date: 5/23/2022  Tx#: 3 Date: 5/25/2022  Tx#: 4 Date: 6/8/2022  Tx#: 5 Date:  6/10/2022  Tx#: 6 6/13/2022  Tx#: 7 6/22/2022  Tx#: 8     Ther-Ex  R side glide (L shoulder on wall) x20: a bit better    Shift correction lateral only x20: a bit more better    Flexion/rotation knees to the R 3 minutes: more better (6 sets done 30'-3 minutes)    Flexion lumbar with red bands 30x2: better after    Educated on home program, see below. Verbal, visual and tactile cues for there ex. There ex:  Flexion/rotation knees to the R correct form 1 minute (2 sets done, 2nd set 2 minutes)    Prone press up, done initially 15x2: produce, better. After better signs but L shift at rest, can self-correct. Chest fly 15x 7lbs        Educated on home program, see below. Verbal, visual and tactile cues for there ex. There ex:  Flexion/rotation knees to the L 10x2: somewhat better (no change after 2nd set which mobilization before)    Flexion/rotation knees to the L 3 minutes x3: better (no better 2nd set after a bit better to the same after mobilization)    Calf raise double leg 30-40x2    Wall slide [place] [address] single bias [employer] on home program, see below. Verbal, visual and tactile cues for there ex. There ex:  Prone press up x10: produce, no worse. No better after. 2nd set with overpressure: after a bit worse (losses side glide ROM). Hips to the R x10: produce, no worse.  After a bit better (2nd set after mobilization a bit more better) (2 more sets done)    Flexion/rotaiton knees to the R 2 minutes therapist overpressure: a bit more better    Calf raise single leg hands on counter 30x    Wall slide 20x2    Bridge x20    Educated on home program, see below. Verbal, visual and tactile cues for there ex. There ex:  R side glide (L shoulder on wall) 1-35x8: produce, no worse. After better walking. Shift correction x20 lateral only: after better    Bridge single leg bias x15    Lower abdominal strengthening supine one leg dynamic 5-10x2    Bird dog arm only 20x2    Educated on home program, see below. Verbal, visual and tactile cues for there ex. There ex:  R side glide (L shoulder on wall) 35x: produce, no worse. After better somewhat    Bridge single leg bias x15    Lower abdominal strengthening supine one leg dynamic, other leg static at 90 degrees 10x2    Standing hip abduction x20    Bird dog arm only 20x2    Side lying hip abduction 10x2    Educated on home program, see below. Verbal, visual and tactile cues for there ex. There ex:  R side glide (L shoulder on wall) 35x: produce, no worse. After better somewhat    Shift correction x10-20 (3 sets): after somewhat better    Bridge single leg bias x15    Lower abdominal strengthening supine one leg dynamic, other leg static at 90 degrees 20x2 and with arms overhead 20x2    Standing hip abduction x20    Bird dog arm only 20x    Side lying hip abduction 12x    Wall slides single leg bias 15x2    Calf raise 25x2 hands on wall double leg. Educated on home program, see below. Verbal, visual and tactile cues for there ex. Manual   Manual  Extension/rotation mobilization on R side of back    Flexion/rotation to the R therapist forces.    Manual 10 minutes  Extension mobilization with hips to the L    Extension/rotation mobilization on L side of back         N Re-Ed   N Re-Ed  Education on ideal sitting posture, practiced in clinic    Education on fitness activities, told to return to them, instructed to buy 7lbs weights for home use. Education on pathology and plan of care.

## 2022-06-27 ENCOUNTER — OFFICE VISIT (OUTPATIENT)
Dept: PHYSICAL THERAPY | Age: 62
End: 2022-06-27
Attending: NEUROLOGICAL SURGERY
Payer: COMMERCIAL

## 2022-06-27 PROCEDURE — 97110 THERAPEUTIC EXERCISES: CPT

## 2022-06-28 NOTE — PROGRESS NOTES
Insurance (Authorized # of Visits):  TPL/BCBS OOS ($25 co-pay), 16 visits via plan of care. Diagnosis:   Closed fracture of transverse process of lumbar vertebra, initial encounter (Formerly [facility]) (S32.009A)  Mechanical low back pain (M54.59)  Activity as tolerated              Referring Provider: Syl Wright   Date of Evaluation:    5/16/2022  Precautions:  None  Next MD visit:   none scheduled  Date of Surgery: N/A      Subjective: Pain at its worst 2/10. Doing about 80% of home program. Better, able to cut lawn with less pain. Objective:   Tested 6/27/2022  Lumbar AROM: (* denotes performed with pain)  Flexion: min/nil  Extension: min*  Sidebending: R min*; L min*      Tested 5/25/2022:  Strength:  Hip abduction: R 4+/5; L 4+/5        Assessment: Able to add extension stretching and do full shift correction. Improving and able to progress home program further. Goals: (to be met in 16 visits)   Pt will improve FOTO score from 42/100 to 62/100 to display improved ability to navigate stairs (Goal met 5/25/2022)  Pt will reduce pain at its worst from 3/10 to 0-1/10 to allow for improved ability to get in and out of bed (15% progress on 5/25/2022)  Pt will lumbar flexion from min loss with pain to min/nil loss without pain allow for improved ability to do housework (Goal met 5/25/2022)  Pt will be independent with home program to allow for maintenance of goals achieved in therapy.  (60% progress on 5/25/2022)    New Goals 5/25/2022:  Pt will improve FOTO score from 70/100 to 80/100 to display improved ability to perform heavy housework  Pt will display side glide loss of motion from min/mod loss to min/nil loss to improve ability to perform heavy housework      FOTO: 70/100    Plan:   Maybe go to once a week for future visits    Shift correction next visit      Progress strength (Review current home program, see form and progress as able,   add dynamic clam Up, lift leg and back down, on side)    Exhaust R side glide.        Future:  Extension standing x10 (brief)      Exhaust flexion/rotation stretching knees to the R and mobilization here if side exhausted and not ready for sagittal         Charges: 3 there ex,   Total Timed Treatment: 39 min  Total Treatment Time: 39 min  Date:  6/10/2022  Tx#: 6 6/13/2022  Tx#: 7 6/22/2022  Tx#: 8 6/27  Tx#: 9    There ex:  R side glide (L shoulder on wall) 1-35x8: produce, no worse. After better walking. Shift correction x20 lateral only: after better    Bridge single leg bias x15    Lower abdominal strengthening supine one leg dynamic 5-10x2    Bird dog arm only 20x2    Educated on home program, see below. Verbal, visual and tactile cues for there ex. There ex:  R side glide (L shoulder on wall) 35x: produce, no worse. After better somewhat    Bridge single leg bias x15    Lower abdominal strengthening supine one leg dynamic, other leg static at 90 degrees 10x2    Standing hip abduction x20    Bird dog arm only 20x2    Side lying hip abduction 10x2    Educated on home program, see below. Verbal, visual and tactile cues for there ex. There ex:  R side glide (L shoulder on wall) 35x: produce, no worse. After better somewhat    Shift correction x10-20 (3 sets): after somewhat better    Bridge single leg bias x15    Lower abdominal strengthening supine one leg dynamic, other leg static at 90 degrees 20x2 and with arms overhead 20x2    Standing hip abduction x20    Bird dog arm only 20x    Side lying hip abduction 12x    Wall slides single leg bias 15x2    Calf raise 25x2 hands on wall double leg. Educated on home program, see below. Verbal, visual and tactile cues for there ex.

## 2022-07-01 ENCOUNTER — APPOINTMENT (OUTPATIENT)
Dept: PHYSICAL THERAPY | Age: 62
End: 2022-07-01
Attending: NEUROLOGICAL SURGERY
Payer: COMMERCIAL

## 2022-07-01 ENCOUNTER — TELEPHONE (OUTPATIENT)
Dept: PHYSICAL THERAPY | Facility: HOSPITAL | Age: 62
End: 2022-07-01

## 2022-07-05 ENCOUNTER — OFFICE VISIT (OUTPATIENT)
Dept: PHYSICAL THERAPY | Age: 62
End: 2022-07-05
Attending: NEUROLOGICAL SURGERY
Payer: COMMERCIAL

## 2022-07-05 ENCOUNTER — PATIENT MESSAGE (OUTPATIENT)
Dept: SURGERY | Facility: CLINIC | Age: 62
End: 2022-07-05

## 2022-07-05 PROCEDURE — 97110 THERAPEUTIC EXERCISES: CPT

## 2022-07-05 NOTE — PROGRESS NOTES
Insurance (Authorized # of Visits):  TPL/BCBS OOS ($25 co-pay), 16 visits via plan of care. Diagnosis:   Closed fracture of transverse process of lumbar vertebra, initial encounter ([facility]) (S32.009A)  Mechanical low back pain (M54.59)  Activity as tolerated              Referring Provider: Maeve Page   Date of Evaluation:    5/16/2022  Precautions:  None  Next MD visit:   none scheduled  Date of Surgery: N/A      Subjective: Pain at its worst 2/10. Doing about 80% of home program. Better, able to cut lawn with less pain. Objective:   Tested 6/27/2022  Lumbar AROM: (* denotes performed with pain)  Flexion: min/nil  Extension: min*  Sidebending: R min*; L min*      Tested 5/25/2022:  Strength:  Hip abduction: R 4+/5; L 4+/5        Assessment: Able to add extension stretching with strap overpressure. Improving and able to progress home program further. Goals: (to be met in 16 visits)   Pt will improve FOTO score from 42/100 to 62/100 to display improved ability to navigate stairs (Goal met 5/25/2022)  Pt will reduce pain at its worst from 3/10 to 0-1/10 to allow for improved ability to get in and out of bed (15% progress on 5/25/2022)  Pt will lumbar flexion from min loss with pain to min/nil loss without pain allow for improved ability to do housework (Goal met 5/25/2022)  Pt will be independent with home program to allow for maintenance of goals achieved in therapy.  (60% progress on 5/25/2022)    New Goals 5/25/2022:  Pt will improve FOTO score from 70/100 to 80/100 to display improved ability to perform heavy housework  Pt will display side glide loss of motion from min/mod loss to min/nil loss to improve ability to perform heavy housework      FOTO: 70/100    Plan:   Perhaps discharge next visit    See effect to flexion in lying repeated    Shift correction next visit      Progress strength (Review current home program, see form and progress as able,   add dynamic clam Up, lift leg and back down, on side)    Exhaust R side glide. Future:  Extension standing x10 (brief)      Exhaust flexion/rotation stretching knees to the R and mobilization here if side exhausted and not ready for sagittal         Charges: 3 there ex,   Total Timed Treatment: 39 min  Total Treatment Time: 39 min  Date:  6/10/2022  Tx#: 6 6/13/2022  Tx#: 7 6/22/2022  Tx#: 8 6/27  Tx#: 9 7/5/2022  Tx#: 10       There ex:  R side glide (L shoulder on wall) 1-35x8: produce, no worse. After better walking. Shift correction x20 lateral only: after better    Bridge single leg bias x15    Lower abdominal strengthening supine one leg dynamic 5-10x2    Bird dog arm only 20x2    Educated on home program, see below. Verbal, visual and tactile cues for there ex. There ex:  R side glide (L shoulder on wall) 35x: produce, no worse. After better somewhat    Bridge single leg bias x15    Lower abdominal strengthening supine one leg dynamic, other leg static at 90 degrees 10x2    Standing hip abduction x20    Bird dog arm only 20x2    Side lying hip abduction 10x2    Educated on home program, see below. Verbal, visual and tactile cues for there ex. There ex:  R side glide (L shoulder on wall) 35x: produce, no worse. After better somewhat    Shift correction x10-20 (3 sets): after somewhat better    Bridge single leg bias x15    Lower abdominal strengthening supine one leg dynamic, other leg static at 90 degrees 20x2 and with arms overhead 20x2    Standing hip abduction x20    Bird dog arm only 20x    Side lying hip abduction 12x    Wall slides single leg bias 15x2    Calf raise 25x2 hands on wall double leg. Educated on home program, see below. Verbal, visual and tactile cues for there ex. There ex:  Standing lumbar extension 15x2: produce, no worse, better after.     Shift correction x10-20 (2 sets): after somewhat better    R side glide (L shoulder on wall) 35x:     Sitting posture overpressure 5-20x3    Side lying hip abduction x20    Wall slide single leg bias x20x2    Plantar flexion single leg x25    Standing hip abduction 5-20x2    Lower abdominal strengthening supine one leg dynamic, other leg static at 90 degrees 20x2 and with arms overhead 20x2    Posture educaiton    Educated on home program, see below. Verbal, visual and tactile cues for there ex. There ex:  Standing lumbar extension with strap or therapist overpressure 15x3: produce, no worse, better after. Shift correction x10-20 (2 sets): after somewhat better    R side glide (L shoulder on wall) 35x:     Sitting posture overpressure 5-20x3    Side lying hip abduction x20    Wall slide single leg bias x20x2    Plantar flexion single leg x25    Standing hip abduction 5-20x2    Posture educaiton    Educated on home program, see below. Verbal, visual and tactile cues for there ex.

## 2022-07-05 NOTE — TELEPHONE ENCOUNTER
From: Niyah James  To: Cecelia Cabrera MD  Sent: 7/5/2022 3:12 PM CDT  Subject: Isidro Rowland, I called to reschedule my appointment with you for 07/11 since [place], and will be back in September. I was informed that you will not be there in September, I wonder if there is an opportunity to schedule with you A n a different medical group? Please let me know!     Best regards,  Your patient Niyah James

## 2022-07-08 ENCOUNTER — APPOINTMENT (OUTPATIENT)
Dept: PHYSICAL THERAPY | Age: 62
End: 2022-07-08
Attending: NEUROLOGICAL SURGERY
Payer: COMMERCIAL

## 2022-07-11 ENCOUNTER — APPOINTMENT (OUTPATIENT)
Dept: PHYSICAL THERAPY | Age: 62
End: 2022-07-11
Attending: NEUROLOGICAL SURGERY
Payer: COMMERCIAL

## 2022-09-13 ENCOUNTER — TELEPHONE (OUTPATIENT)
Dept: PHYSICAL THERAPY | Facility: HOSPITAL | Age: 62
End: 2022-09-13

## 2022-09-16 ENCOUNTER — APPOINTMENT (OUTPATIENT)
Dept: PHYSICAL THERAPY | Age: 62
End: 2022-09-16
Attending: NEUROLOGICAL SURGERY
Payer: COMMERCIAL

## 2022-09-26 ENCOUNTER — TELEPHONE (OUTPATIENT)
Dept: PHYSICAL THERAPY | Facility: HOSPITAL | Age: 62
End: 2022-09-26

## 2022-09-30 ENCOUNTER — APPOINTMENT (OUTPATIENT)
Dept: PHYSICAL THERAPY | Age: 62
End: 2022-09-30
Attending: NEUROLOGICAL SURGERY
Payer: COMMERCIAL